# Patient Record
Sex: FEMALE | Race: WHITE | Employment: FULL TIME | URBAN - METROPOLITAN AREA
[De-identification: names, ages, dates, MRNs, and addresses within clinical notes are randomized per-mention and may not be internally consistent; named-entity substitution may affect disease eponyms.]

---

## 2024-04-07 ENCOUNTER — OFFICE VISIT (OUTPATIENT)
Age: 49
End: 2024-04-07

## 2024-04-07 VITALS
OXYGEN SATURATION: 100 % | SYSTOLIC BLOOD PRESSURE: 111 MMHG | TEMPERATURE: 98.9 F | RESPIRATION RATE: 22 BRPM | HEART RATE: 77 BPM | WEIGHT: 166.3 LBS | DIASTOLIC BLOOD PRESSURE: 70 MMHG

## 2024-04-07 DIAGNOSIS — R51.9 PAIN OF CHEEK: ICD-10-CM

## 2024-04-07 DIAGNOSIS — R68.84 JAW PAIN: ICD-10-CM

## 2024-04-07 DIAGNOSIS — S09.93XA FACIAL INJURY, INITIAL ENCOUNTER: Primary | ICD-10-CM

## 2024-04-07 RX ORDER — BUPROPION HYDROCHLORIDE 150 MG/1
150 TABLET ORAL EVERY MORNING
COMMUNITY
Start: 2023-01-26

## 2024-04-07 RX ORDER — SERTRALINE HYDROCHLORIDE 100 MG/1
200 TABLET, FILM COATED ORAL DAILY
COMMUNITY
Start: 2023-01-26

## 2024-04-07 RX ORDER — ROPINIROLE 1 MG/1
1 TABLET, FILM COATED ORAL
COMMUNITY

## 2024-04-07 NOTE — PROGRESS NOTES
Chief Complaint   Patient presents with    Head Injury     Patient reports when walking her dogs she lost control of them and they started to pull her and she ran into a wall x 1 hr ago, C/o Left side facial pain and headache        HPI    Patient states she was walking her 2 dogs and lost control of them and she ran into a wall and hit the left side of her face.  She denies hitting her head or ear, she did not loose conciousness, no lost of teeth or bleeding noted.  She states it hurts to talk, open her mouth, chew and it hurts to touch and have mild swelling.  She denies any breathing issues, blurred vision, teeth pain, dizziness or vertigo, or hearing issues.  She states she does have some numbness with tenderness to left side jaw line and chin area.  She states prior to visit today she took 800 mg ibuprofen and put ice on left side of face.      No past medical history on file.    No past surgical history on file.    Social History     Tobacco Use    Smoking status: Never     Passive exposure: Never    Smokeless tobacco: Never   Substance Use Topics    Alcohol use: Yes    Drug use: Never        Allergies   Allergen Reactions    Codeine Nausea And Vomiting     Reaction Type: Allergy        Review of Systems   Constitutional:  Negative for chills, fatigue and fever.   HENT:  Positive for facial swelling. Negative for ear discharge, ear pain, mouth sores, nosebleeds, rhinorrhea, sinus pressure, sinus pain and trouble swallowing.    Eyes: Negative.    Respiratory:  Negative for cough and shortness of breath.    Cardiovascular:  Negative for chest pain and palpitations.        /70 (Site: Left Upper Arm, Position: Sitting, Cuff Size: Medium Adult)   Pulse 77   Temp 98.9 °F (37.2 °C) (Oral)   Resp 22   Wt 75.4 kg (166 lb 4.8 oz)   SpO2 100%      Physical Exam  Constitutional:       Appearance: Normal appearance.   HENT:      Head: Normocephalic.      Right Ear: Tympanic membrane normal.      Left Ear: